# Patient Record
Sex: FEMALE | Race: WHITE | URBAN - METROPOLITAN AREA
[De-identification: names, ages, dates, MRNs, and addresses within clinical notes are randomized per-mention and may not be internally consistent; named-entity substitution may affect disease eponyms.]

---

## 2022-08-04 ENCOUNTER — OFFICE VISIT (OUTPATIENT)
Dept: OBGYN CLINIC | Age: 27
End: 2022-08-04
Payer: COMMERCIAL

## 2022-08-04 VITALS — SYSTOLIC BLOOD PRESSURE: 110 MMHG | WEIGHT: 135 LBS | BODY MASS INDEX: 22.47 KG/M2 | DIASTOLIC BLOOD PRESSURE: 68 MMHG

## 2022-08-04 DIAGNOSIS — G43.829 MENSTRUAL MIGRAINE WITHOUT STATUS MIGRAINOSUS, NOT INTRACTABLE: ICD-10-CM

## 2022-08-04 DIAGNOSIS — N92.1 BREAKTHROUGH BLEEDING ON OCPS: Primary | ICD-10-CM

## 2022-08-04 DIAGNOSIS — E28.2 PCOS (POLYCYSTIC OVARIAN SYNDROME): ICD-10-CM

## 2022-08-04 DIAGNOSIS — N92.1 MENOMETRORRHAGIA: ICD-10-CM

## 2022-08-04 DIAGNOSIS — N91.1 SECONDARY AMENORRHEA: ICD-10-CM

## 2022-08-04 DIAGNOSIS — Z30.09 ENCOUNTER FOR COUNSELING REGARDING CONTRACEPTION: ICD-10-CM

## 2022-08-04 PROCEDURE — 99214 OFFICE O/P EST MOD 30 MIN: CPT | Performed by: OBSTETRICS & GYNECOLOGY

## 2022-08-04 PROCEDURE — 76830 TRANSVAGINAL US NON-OB: CPT | Performed by: OBSTETRICS & GYNECOLOGY

## 2022-08-04 RX ORDER — PROPRANOLOL HYDROCHLORIDE 20 MG/1
20 TABLET ORAL DAILY
Qty: 90 TABLET | Refills: 0 | Status: SHIPPED | OUTPATIENT
Start: 2022-08-04

## 2022-08-04 RX ORDER — PROPRANOLOL HYDROCHLORIDE 20 MG/1
20 TABLET ORAL DAILY
COMMUNITY
End: 2022-08-04 | Stop reason: SDUPTHER

## 2022-08-04 RX ORDER — NORETHINDRONE ACETATE AND ETHINYL ESTRADIOL 1.5; 3 MG/1; UG/1
TABLET ORAL
COMMUNITY
Start: 2022-07-04

## 2022-08-04 RX ORDER — ESTRADIOL 2 MG/1
2 TABLET ORAL DAILY
Qty: 14 TABLET | Refills: 0 | Status: SHIPPED | OUTPATIENT
Start: 2022-08-04

## 2022-08-04 NOTE — PROGRESS NOTES
CC:  FU secondary amenorrhea while on continuous OCPs     HPI:  32 y.o.  G0  presents today for FU for c/o secondary amenorrhea while on continuous OCPs. See last note from 5/4/2022 for complete details when she presented to me as a new patient at that time. Patient's last menstrual period was 07/22/2022 (exact date). Moved from [place] in the past year. Pt c/o secondary amenorrhea w/ LMP approximately 10 months prior when I saw her initially. PCP: none     Contraception:  Junel (1.5/30mcg) OCP-- was taking in a continuous fashion  UPT NEG       Reports long hx of extremely HMB/IMB and was diagnosed previously w/ PCOS  BMI 22   Currently on Metormin 500mg BID   Reports last hgb A1 C was wnl but 2yrs ago was \"borderline diabetic\"     States has been on OCPs since age 11yo      States her last OBGYN instructed her to take her OCPs in a continuous fashion x3 months, then take a week off for a period to decrease the propensity for BTB. She states that she has not taken a birth control pill in 11 days and still has not had a period yet. Patient states this has been the first time she's ever not had a period at all and was concerned that something could be wrong. She reports that she has been taking the OCPs continuously like this for about a year. She also has a history of menstrual migraines and has headaches when she does not take her OCPs. Reports her last TVUS was around September 2021 in [place] and thinks it was normal other than some cysts on her ovaries. no post coital VB, no  dyspareunia.          Sexually active w/ same parner  No changes in sexual partners --declines STD testing                Since last visti:  Discussed with patient in depth at last visit that the likely etiology of her secondary amenorrhea is iatrogenic in nature as this is the purpose of taking the OCPs in continuous fashion (to intentionally skip periods altogether) given her history of very heavy/irregular periods in the past along with menstrual migraines. Reassurance given and labs performed to R/O any other potential etiology and patient counseled she is free to continue to take her OCPs as she has been in continuous fashion without placebo week. However, patient reports having recently started to have BTB on her OCPs in continuous fashion. Reports having spotting with \"brown clots\" off and on for 20 days. No additional VB for the past 10 days. No new symptoms otherwise. Labs:   TFTs, Prolactin, Hgb A1C all wnl         TVUS tdoay:  Uterus 39mL  EMS 1.85mm  Rt ov polycystic appearance  Lt ov limited views but appears polycystic   Min FF                    POTS:  Propranolol --requests refill today until she can find a PCP here  Has seen cardiology recently  No history HTN         GYN HISTORY:  As per HPI     Last Pap: 2022--negative cytology  Hx of Abnl Paps: none    Hx STDs: none  Gardasil vaccine: Thinks yes            OB History          0    Para   0    Term   0       0    AB   0    Living   0         SAB   0    IAB   0    Ectopic   0    Molar   0    Multiple   0    Live Births   0                  Past Medical History:   Diagnosis Date    Menometrorrhagia     Menstrual migraine     PCOS (polycystic ovarian syndrome)     POTS (postural orthostatic tachycardia syndrome)          Past Surgical History:   Procedure Laterality Date    HX ORTHOPAEDIC Bilateral     GRECIA HIP    HX TONSILLECTOMY           Outpatient Encounter Medications as of 2022   Medication Sig Dispense Refill    propranoloL (INDERAL) 20 mg tablet Take 20 mg by mouth daily. metFORMIN (GLUCOPHAGE) 500 mg tablet Take 500 mg by mouth two (2) times a day. norethindrone ac-eth estradioL 1.5-30 mg-mcg tab Take 1 Tablet by mouth daily. In a continuous fashion. 3 Dose Pack 4    [DISCONTINUED] norethindrone ac-eth estradioL 1.5-30 mg-mcg tab Take  by mouth daily.        No facility-administered encounter medications on file as of 5/4/2022. No Known Allergies      Family History   Problem Relation Age of Onset    Other Mother         \"precancer breast lumps-had bilateral masectomy\" rare gene in family; scleroderma    Breast Cancer Maternal Grandmother     Diabetes Maternal Grandmother     Osteoporosis Maternal Grandmother     Diabetes Maternal Grandfather     Diabetes Paternal Grandmother     Diabetes Paternal Grandfather     Hypertension Paternal Grandfather     Heart Disease Paternal Grandfather     Breast Cancer Maternal Great Grandmother     Breast Cancer Cousin          Social History     Socioeconomic History    Marital status: SINGLE   Tobacco Use    Smoking status: Never Smoker    Smokeless tobacco: Never Used   Substance and Sexual Activity    Alcohol use: Yes     Comment: occ    Sexual activity: Yes     Partners: Male     Birth control/protection: Pill           ROS:  Negative except as per HPI       PHYSICAL EXAM:  /68   Wt 135 lb (61.2 kg)   LMP 07/22/2022 (Exact Date)   BMI 22.47 kg/m²     Physical Exam:  Constitutional: She appears well-developed and well-nourished. No distress. HENT:    Head: Normocephalic and atraumatic. Cardiovascular: Regular pulse   Pulmonary/Chest: Effort normal  Skin: She is not diaphoretic. Psychiatric: She has a normal mood and affect. Her behavior is normal. Thought content normal. .      Pelvic exam not repeated today        Counseling:  Based on history, the way she has been taking her OCPs, and ultrasound images today still the most likely cause for her BTB is iatrogenic in nature. Very thin  EMS. Recommend trial of add back estrogen in addition to a break from her OCPs with backup condoms. If the AUB continues despite this after restarting her OCPs would recommend further evaluation with hysteroscopy D&C. Patient agrees.       ASSESSMENT/PLAN:   32 y.o., G0 w/ new BTB on continuous  OCPs for hx of HMB/IMB, menstrual migraines :    -See counseling  -Almost certainly iatrogenic in nature; needs add back estrogen  -DC OCPs for 2-4wks w/ backup condoms  -Rx Estrace 2mg PO QD x 14 days while off OCPs  -Can restart OCPs in continuous fashion thereafter  -RTO to recheck sxs in 3 months  -if IMB continues despite above interventions will plan for further evaluation with hysteroscopy D&C  -Refill of Propranolol sent --- patient to find PCP for further management thereafter             Marely Puentes MD

## 2023-03-13 ENCOUNTER — TELEPHONE (OUTPATIENT)
Dept: OBGYN CLINIC | Age: 28
End: 2023-03-13

## 2023-03-13 RX ORDER — NORETHINDRONE ACETATE AND ETHINYL ESTRADIOL .03; 1.5 MG/1; MG/1
1 TABLET ORAL DAILY
Qty: 1 PACKET | Refills: 1 | Status: SHIPPED | OUTPATIENT
Start: 2023-03-13

## 2023-03-13 RX ORDER — NORETHINDRONE ACETATE AND ETHINYL ESTRADIOL .03; 1.5 MG/1; MG/1
1 TABLET ORAL DAILY
COMMUNITY
End: 2023-03-13 | Stop reason: SDUPTHER

## 2023-05-28 SDOH — ECONOMIC STABILITY: FOOD INSECURITY: WITHIN THE PAST 12 MONTHS, THE FOOD YOU BOUGHT JUST DIDN'T LAST AND YOU DIDN'T HAVE MONEY TO GET MORE.: NEVER TRUE

## 2023-05-28 SDOH — ECONOMIC STABILITY: TRANSPORTATION INSECURITY
IN THE PAST 12 MONTHS, HAS LACK OF TRANSPORTATION KEPT YOU FROM MEETINGS, WORK, OR FROM GETTING THINGS NEEDED FOR DAILY LIVING?: NO

## 2023-05-28 SDOH — ECONOMIC STABILITY: FOOD INSECURITY: WITHIN THE PAST 12 MONTHS, YOU WORRIED THAT YOUR FOOD WOULD RUN OUT BEFORE YOU GOT MONEY TO BUY MORE.: NEVER TRUE

## 2023-05-28 SDOH — ECONOMIC STABILITY: HOUSING INSECURITY
IN THE LAST 12 MONTHS, WAS THERE A TIME WHEN YOU DID NOT HAVE A STEADY PLACE TO SLEEP OR SLEPT IN A SHELTER (INCLUDING NOW)?: NO

## 2023-05-28 SDOH — ECONOMIC STABILITY: INCOME INSECURITY: HOW HARD IS IT FOR YOU TO PAY FOR THE VERY BASICS LIKE FOOD, HOUSING, MEDICAL CARE, AND HEATING?: NOT HARD AT ALL

## 2023-05-31 ENCOUNTER — OFFICE VISIT (OUTPATIENT)
Dept: OBGYN CLINIC | Age: 28
End: 2023-05-31
Payer: COMMERCIAL

## 2023-05-31 VITALS
DIASTOLIC BLOOD PRESSURE: 72 MMHG | BODY MASS INDEX: 21.49 KG/M2 | HEIGHT: 65 IN | SYSTOLIC BLOOD PRESSURE: 96 MMHG | WEIGHT: 129 LBS

## 2023-05-31 DIAGNOSIS — Z30.09 ENCOUNTER FOR COUNSELING REGARDING CONTRACEPTION: ICD-10-CM

## 2023-05-31 DIAGNOSIS — Z12.4 PAP SMEAR FOR CERVICAL CANCER SCREENING: ICD-10-CM

## 2023-05-31 DIAGNOSIS — N94.6 SEVERE DYSMENORRHEA: ICD-10-CM

## 2023-05-31 DIAGNOSIS — Z80.3 FAMILY HISTORY OF BREAST CANCER: ICD-10-CM

## 2023-05-31 DIAGNOSIS — E28.2 POLYCYSTIC OVARIAN SYNDROME: ICD-10-CM

## 2023-05-31 DIAGNOSIS — Z01.419 WELL WOMAN EXAM WITH ROUTINE GYNECOLOGICAL EXAM: Primary | ICD-10-CM

## 2023-05-31 PROCEDURE — 99395 PREV VISIT EST AGE 18-39: CPT | Performed by: OBSTETRICS & GYNECOLOGY

## 2023-05-31 RX ORDER — NORETHINDRONE ACETATE AND ETHINYL ESTRADIOL .03; 1.5 MG/1; MG/1
1 TABLET ORAL DAILY
Qty: 1 PACKET | Refills: 1 | Status: CANCELLED | OUTPATIENT
Start: 2023-05-31

## 2023-05-31 RX ORDER — NORETHINDRONE ACETATE AND ETHINYL ESTRADIOL .03; 1.5 MG/1; MG/1
TABLET ORAL
Qty: 4 PACKET | Refills: 5 | Status: SHIPPED | OUTPATIENT
Start: 2023-05-31
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
## 2023-06-05 LAB
CYTOLOGIST CVX/VAG CYTO: NORMAL
CYTOLOGY CVX/VAG DOC THIN PREP: NORMAL
HPV REFLEX: NORMAL
Lab: NORMAL
PATH REPORT.FINAL DX SPEC: NORMAL
STAT OF ADQ CVX/VAG CYTO-IMP: NORMAL

## 2024-06-17 ENCOUNTER — OFFICE VISIT (OUTPATIENT)
Dept: OBGYN CLINIC | Age: 29
End: 2024-06-17

## 2024-06-17 VITALS
HEIGHT: 65 IN | SYSTOLIC BLOOD PRESSURE: 110 MMHG | DIASTOLIC BLOOD PRESSURE: 60 MMHG | BODY MASS INDEX: 22.66 KG/M2 | WEIGHT: 136 LBS

## 2024-06-17 DIAGNOSIS — Z87.898 HISTORY OF PREDIABETES: ICD-10-CM

## 2024-06-17 DIAGNOSIS — Z01.419 WELL WOMAN EXAM WITH ROUTINE GYNECOLOGICAL EXAM: Primary | ICD-10-CM

## 2024-06-17 DIAGNOSIS — R79.89 ELEVATED DHEA: ICD-10-CM

## 2024-06-17 DIAGNOSIS — Z80.3 FAMILY HISTORY OF BREAST CANCER: ICD-10-CM

## 2024-06-17 DIAGNOSIS — Z12.4 PAP SMEAR FOR CERVICAL CANCER SCREENING: ICD-10-CM

## 2024-06-17 DIAGNOSIS — E28.2 PCOS (POLYCYSTIC OVARIAN SYNDROME): ICD-10-CM

## 2024-06-17 DIAGNOSIS — R79.89 ELEVATED SERUM CREATININE: ICD-10-CM

## 2024-06-17 LAB
ALBUMIN SERPL-MCNC: 3.9 G/DL (ref 3.5–5)
ALBUMIN/GLOB SERPL: 1.4 (ref 1–1.9)
ALP SERPL-CCNC: 35 U/L (ref 35–104)
ALT SERPL-CCNC: 16 U/L (ref 12–65)
ANION GAP SERPL CALC-SCNC: 13 MMOL/L (ref 9–18)
AST SERPL-CCNC: 28 U/L (ref 15–37)
BILIRUB SERPL-MCNC: 0.6 MG/DL (ref 0–1.2)
BUN SERPL-MCNC: 9 MG/DL (ref 6–23)
CALCIUM SERPL-MCNC: 9.1 MG/DL (ref 8.8–10.2)
CHLORIDE SERPL-SCNC: 101 MMOL/L (ref 98–107)
CO2 SERPL-SCNC: 25 MMOL/L (ref 20–28)
CREAT SERPL-MCNC: 0.91 MG/DL (ref 0.6–1.1)
EST. AVERAGE GLUCOSE BLD GHB EST-MCNC: 99 MG/DL
GLOBULIN SER CALC-MCNC: 2.8 G/DL (ref 2.3–3.5)
GLUCOSE SERPL-MCNC: 86 MG/DL (ref 70–99)
HBA1C MFR BLD: 5.1 % (ref 0–5.6)
POTASSIUM SERPL-SCNC: 4.2 MMOL/L (ref 3.5–5.1)
PROT SERPL-MCNC: 6.7 G/DL (ref 6.3–8.2)
SODIUM SERPL-SCNC: 138 MMOL/L (ref 136–145)

## 2024-06-17 SDOH — ECONOMIC STABILITY: FOOD INSECURITY: WITHIN THE PAST 12 MONTHS, THE FOOD YOU BOUGHT JUST DIDN'T LAST AND YOU DIDN'T HAVE MONEY TO GET MORE.: NEVER TRUE

## 2024-06-17 SDOH — ECONOMIC STABILITY: FOOD INSECURITY: WITHIN THE PAST 12 MONTHS, YOU WORRIED THAT YOUR FOOD WOULD RUN OUT BEFORE YOU GOT MONEY TO BUY MORE.: NEVER TRUE

## 2024-06-17 SDOH — ECONOMIC STABILITY: INCOME INSECURITY: HOW HARD IS IT FOR YOU TO PAY FOR THE VERY BASICS LIKE FOOD, HOUSING, MEDICAL CARE, AND HEATING?: NOT HARD AT ALL

## 2024-06-17 NOTE — PROGRESS NOTES
CC:  Annual GYN exam    HPI:  29 y.o.  G0  presents today for a routine gynecological examination.  No LMP recorded. (Menstrual status: Other - See Notes).. Pt w/ long hx of extremely HMB/IMB, dysmenorrhea; prior diagnoses of PCOS at outside facility.       PCP: None currently    Contraception:    continuous OCPs    No periods w/ this regimen (only w/ running out of Rx previously-- see last note for complete details)       Has been on OCPs since age 14yo for aforementioned issues.   No excessive bleeidng w/ PSH  +hx anemia but no hx iron or blood tranfusions     Possible endometriosis has been discussed.  No pain w/ sex     reports 1 or 2 instances of postcoital spotting--rare; declines further w/u at this time      New sexual  partner; DECLINES STD testing            PCOS, hx PreDiabetes:  BMI wnl at 22   Was previously on Metformin 500mg BID -- has not taken in approx 1.5yrs      Labs 5/4/22:  Prolactin, TFTs wnl  hgb A1C 5.3     Today reports was told by a MD in [place] that her \"value on her adrenal gland was very high\" but she never had a recheck; unable to see this lab/records in careeverywhere   Would presume she is referring to [facility] given discussed in regards to prior w/u for PCOS there -- will check today       Also incidentally noted to have an elevated Creatintine at last check  11/2023 in care everywhere; will recheck labs today     No changes/inc in male pattern hair growth, acne, or deepening of voice, etc          Last TVUS 8/4/22:  Uterus 39mL  EMS 1.85mm  Rt ov polycystic appearance  Lt ov limited views but appears polycystic   Min FF              Hx Menstrual Migraines:  + headaches when she does not take her continuous OCPs.  No Aura           POTS:  Propranolol    Has seen cardiology    No history HTN           Fam hx Breast CA:  States family has an unknown linked cancer gene--- states not BRCA    Pt's mom w/ \"precancerous cells\" -- now s/p double mastectomy  MGM, mom's 1st cousin, maternal

## 2024-06-19 LAB — DHEA-S SERPL-MCNC: 222 UG/DL (ref 84.8–378)

## 2024-06-20 LAB
COLLECTION METHOD: NORMAL
CYTOLOGIST CVX/VAG CYTO: NORMAL
CYTOLOGY CVX/VAG DOC THIN PREP: NORMAL
HPV REFLEX: NORMAL
Lab: NORMAL
OTHER PT INFO: NORMAL
PAP SOURCE: NORMAL
PATH REPORT.FINAL DX SPEC: NORMAL
PREV CYTO INFO: NORMAL
PREV TREATMENT RESULTS: NORMAL
PREV TREATMENT: NORMAL
STAT OF ADQ CVX/VAG CYTO-IMP: NORMAL

## 2024-06-21 PROBLEM — Z87.898 HISTORY OF PREDIABETES: Status: ACTIVE | Noted: 2024-06-21

## 2024-06-21 RX ORDER — NORETHINDRONE ACETATE AND ETHINYL ESTRADIOL .03; 1.5 MG/1; MG/1
TABLET ORAL
Qty: 4 PACKET | Refills: 6 | Status: SHIPPED | OUTPATIENT
Start: 2024-06-21

## 2024-11-12 SDOH — HEALTH STABILITY: PHYSICAL HEALTH: ON AVERAGE, HOW MANY MINUTES DO YOU ENGAGE IN EXERCISE AT THIS LEVEL?: 50 MIN

## 2024-11-12 SDOH — HEALTH STABILITY: PHYSICAL HEALTH: ON AVERAGE, HOW MANY DAYS PER WEEK DO YOU ENGAGE IN MODERATE TO STRENUOUS EXERCISE (LIKE A BRISK WALK)?: 6 DAYS

## 2024-11-12 ASSESSMENT — PATIENT HEALTH QUESTIONNAIRE - PHQ9
SUM OF ALL RESPONSES TO PHQ QUESTIONS 1-9: 0
SUM OF ALL RESPONSES TO PHQ QUESTIONS 1-9: 0
SUM OF ALL RESPONSES TO PHQ9 QUESTIONS 1 & 2: 0
2. FEELING DOWN, DEPRESSED OR HOPELESS: NOT AT ALL
2. FEELING DOWN, DEPRESSED OR HOPELESS: NOT AT ALL
1. LITTLE INTEREST OR PLEASURE IN DOING THINGS: NOT AT ALL
SUM OF ALL RESPONSES TO PHQ QUESTIONS 1-9: 0
1. LITTLE INTEREST OR PLEASURE IN DOING THINGS: NOT AT ALL
SUM OF ALL RESPONSES TO PHQ9 QUESTIONS 1 & 2: 0
SUM OF ALL RESPONSES TO PHQ QUESTIONS 1-9: 0

## 2024-11-13 ENCOUNTER — OFFICE VISIT (OUTPATIENT)
Dept: INTERNAL MEDICINE CLINIC | Facility: CLINIC | Age: 29
End: 2024-11-13
Payer: COMMERCIAL

## 2024-11-13 VITALS
OXYGEN SATURATION: 100 % | WEIGHT: 140 LBS | HEIGHT: 65 IN | SYSTOLIC BLOOD PRESSURE: 106 MMHG | DIASTOLIC BLOOD PRESSURE: 79 MMHG | HEART RATE: 65 BPM | BODY MASS INDEX: 23.32 KG/M2

## 2024-11-13 DIAGNOSIS — G90.A POTS (POSTURAL ORTHOSTATIC TACHYCARDIA SYNDROME): Primary | ICD-10-CM

## 2024-11-13 DIAGNOSIS — Z76.89 ENCOUNTER TO ESTABLISH CARE: ICD-10-CM

## 2024-11-13 DIAGNOSIS — Z13.220 ENCOUNTER FOR SCREENING FOR LIPID DISORDER: ICD-10-CM

## 2024-11-13 DIAGNOSIS — Z79.899 OTHER LONG TERM (CURRENT) DRUG THERAPY: ICD-10-CM

## 2024-11-13 DIAGNOSIS — G90.A POTS (POSTURAL ORTHOSTATIC TACHYCARDIA SYNDROME): ICD-10-CM

## 2024-11-13 DIAGNOSIS — E28.2 PCOS (POLYCYSTIC OVARIAN SYNDROME): ICD-10-CM

## 2024-11-13 PROBLEM — Z87.898 HISTORY OF PREDIABETES: Status: RESOLVED | Noted: 2024-06-21 | Resolved: 2024-11-13

## 2024-11-13 PROBLEM — Z80.3 FAMILY HISTORY OF BREAST CANCER: Status: RESOLVED | Noted: 2023-05-31 | Resolved: 2024-11-13

## 2024-11-13 LAB
ALBUMIN SERPL-MCNC: 4.2 G/DL (ref 3.5–5)
ALBUMIN/GLOB SERPL: 1.4 (ref 1–1.9)
ALP SERPL-CCNC: 30 U/L (ref 35–104)
ALT SERPL-CCNC: 20 U/L (ref 8–45)
ANION GAP SERPL CALC-SCNC: 11 MMOL/L (ref 7–16)
AST SERPL-CCNC: 28 U/L (ref 15–37)
BASOPHILS # BLD: 0 K/UL (ref 0–0.2)
BASOPHILS NFR BLD: 1 % (ref 0–2)
BILIRUB SERPL-MCNC: 0.5 MG/DL (ref 0–1.2)
BUN SERPL-MCNC: 12 MG/DL (ref 6–23)
CALCIUM SERPL-MCNC: 9.2 MG/DL (ref 8.8–10.2)
CHLORIDE SERPL-SCNC: 103 MMOL/L (ref 98–107)
CHOLEST SERPL-MCNC: 167 MG/DL (ref 0–200)
CO2 SERPL-SCNC: 25 MMOL/L (ref 20–29)
CREAT SERPL-MCNC: 1.03 MG/DL (ref 0.6–1.1)
DIFFERENTIAL METHOD BLD: NORMAL
EOSINOPHIL # BLD: 0.1 K/UL (ref 0–0.8)
EOSINOPHIL NFR BLD: 1 % (ref 0.5–7.8)
ERYTHROCYTE [DISTWIDTH] IN BLOOD BY AUTOMATED COUNT: 11.9 % (ref 11.9–14.6)
GLOBULIN SER CALC-MCNC: 2.9 G/DL (ref 2.3–3.5)
GLUCOSE SERPL-MCNC: 95 MG/DL (ref 70–99)
HCT VFR BLD AUTO: 41.1 % (ref 35.8–46.3)
HDLC SERPL-MCNC: 75 MG/DL (ref 40–60)
HDLC SERPL: 2.2 (ref 0–5)
HGB BLD-MCNC: 13.8 G/DL (ref 11.7–15.4)
IMM GRANULOCYTES # BLD AUTO: 0 K/UL (ref 0–0.5)
IMM GRANULOCYTES NFR BLD AUTO: 0 % (ref 0–5)
LDLC SERPL CALC-MCNC: 73 MG/DL (ref 0–100)
LYMPHOCYTES # BLD: 1.6 K/UL (ref 0.5–4.6)
LYMPHOCYTES NFR BLD: 27 % (ref 13–44)
MCH RBC QN AUTO: 31.3 PG (ref 26.1–32.9)
MCHC RBC AUTO-ENTMCNC: 33.6 G/DL (ref 31.4–35)
MCV RBC AUTO: 93.2 FL (ref 82–102)
MONOCYTES # BLD: 0.3 K/UL (ref 0.1–1.3)
MONOCYTES NFR BLD: 5 % (ref 4–12)
NEUTS SEG # BLD: 3.9 K/UL (ref 1.7–8.2)
NEUTS SEG NFR BLD: 66 % (ref 43–78)
NRBC # BLD: 0 K/UL (ref 0–0.2)
PLATELET # BLD AUTO: 236 K/UL (ref 150–450)
PMV BLD AUTO: 10.1 FL (ref 9.4–12.3)
POTASSIUM SERPL-SCNC: 4 MMOL/L (ref 3.5–5.1)
PROT SERPL-MCNC: 7.2 G/DL (ref 6.3–8.2)
RBC # BLD AUTO: 4.41 M/UL (ref 4.05–5.2)
SODIUM SERPL-SCNC: 139 MMOL/L (ref 136–145)
TRIGL SERPL-MCNC: 96 MG/DL (ref 0–150)
VLDLC SERPL CALC-MCNC: 19 MG/DL (ref 6–23)
WBC # BLD AUTO: 5.8 K/UL (ref 4.3–11.1)

## 2024-11-13 PROCEDURE — 99204 OFFICE O/P NEW MOD 45 MIN: CPT | Performed by: INTERNAL MEDICINE

## 2024-11-13 RX ORDER — PROPRANOLOL HCL 20 MG
20 TABLET ORAL 2 TIMES DAILY
Qty: 180 TABLET | Refills: 3 | Status: SHIPPED | OUTPATIENT
Start: 2024-11-13 | End: 2025-11-08

## 2024-11-13 NOTE — PROGRESS NOTES
Maintenance:  -Pap smear due 6/17/2027    -Previous medical records and/or labs/tests available to me reviewed, any records outstanding not available requested  -The risks, benefits, and medical necessity of all medications, tests labs, and any other orders that were ordered at today's visit were discussed with the patient including all elective or patient requested orders.  Decision to order or not order tests are based on this risk/benefit ratio and medical necessity.  -The patient expresses understanding of the plan as I've explained it to her and is in agreement with the current plan.    Follow up: 1 year    Total Time: 45 minutes (chart review and in-exam time)    Signed: Fernando Duggan D.O.  11/13/24  10:46 AM

## 2025-04-30 NOTE — PROGRESS NOTES
[facility] - Initial Evaluation   Patient: Jasmin Earl   YOB: 1995      Location: [facility] HEMATOLOGY AND ONCOLOGY - Provider participated in today's evaluation via telemedicine in [place], [place]. Patient completed today's evaluation from [place].   Date of Evaluation: 5/20/2025      Referral Source: Jailene Fuller MD   Referral Reason: Z80.3 (ICD-10-CM) - Family history of breast cancer       Genetic Counselor: Nadja Cid MS, [facility], [facility] [place] counseling student        Jasmin Earl was referred for evaluation for the potential of hereditary cancer due to her family history of breast cancer. Jasmin has consented to a visit via telehealth. Personalized risk assessment was performed and genetic testing options were reviewed.  For full details, please see Risk Assessment and Discussion in this document.  Following genetic counseling, Jasmin's action plan is:    PURSUES TESTING: [employer] panel of 85 genes associated with hereditary cancer (including BRCA1 and BRCA2) was offered and accepted.  Test results should be available in approximately 3-4 weeks.     Relevant Personal Medical History   Jasmin is a 29-year-old female with no personal cancer history.    Hormonal history:  Age of Menarche: 12-13  Age of First Live Birth: Denies  Age of Menopause: Denies  Hormonal Replacement Therapy: N/A    Screening history:  Last mammogram: Denies  History of breast biopsy: N/A  Last pap smear: a year ago  Latest colonoscopy: Denies  History of polyps: N/A  Last dermatological exam: Hasn't seen in a while    Social history:  Tobacco use: Denies  Alcohol use: Socially    General medical history:  Past Medical History:   Diagnosis Date    Anemia 2008    Family history of breast cancer     History of prediabetes 06/21/2024    Menometrorrhagia     Menorrhagia     Menstrual migraine     PCOS (polycystic ovarian syndrome)     POTS (postural orthostatic

## 2025-05-20 ENCOUNTER — TELEMEDICINE (OUTPATIENT)
Dept: ONCOLOGY | Age: 30
End: 2025-05-20

## 2025-05-20 DIAGNOSIS — Z80.3 FAMILY HISTORY OF MALIGNANT NEOPLASM OF BREAST: Primary | ICD-10-CM

## 2025-05-28 ENCOUNTER — HOSPITAL ENCOUNTER (OUTPATIENT)
Dept: LAB | Age: 30
Discharge: HOME | End: 2025-05-28
Payer: COMMERCIAL

## 2025-05-28 DIAGNOSIS — Z80.3 FAMILY HISTORY OF MALIGNANT NEOPLASM OF BREAST: ICD-10-CM

## 2025-05-28 LAB
COLLECTION INFORMATION: NORMAL
DATE SENT TO REF LAB: NORMAL
Lab: NORMAL

## 2025-05-28 PROCEDURE — 36415 COLL VENOUS BLD VENIPUNCTURE: CPT

## 2025-06-11 ENCOUNTER — CLINICAL DOCUMENTATION (OUTPATIENT)
Dept: ONCOLOGY | Age: 30
End: 2025-06-11

## 2025-06-11 NOTE — PROGRESS NOTES
Date: 6/11/2025    Patient: Jasmin Earl  YOB: 1995    Provider: Nadja Cid MS DHAVAL Castellanos, you were previously referred by Jailene Fuller MD for an initial genetic consultation due to your family history of cancer. You were seen on May 20th, 2025 and consented to genetic testing, which was sent to [facility]. I called you on June 11th, 2025 to discuss the results of this testing. This letter is a summary of the results.     Results  During our initial meeting, we reviewed your personal and family history. Following discussion of your  history we discussed the benefits, limitations and possible impacts of genetic testing and you pursued a panel called [employer] looking at 85 genes in which pathogenic variants (harmful genetic differences) have been related to increased risk for cancer. The genes included in this panel were AIP, ALK, APC, GERARDO, BAP1, BARD1, BMPR1A, BRCA1, BRCA2, BRIP1, CDC73, CDH1, CDK4, CDKN1B, CDKN2A, CEBPA, CHEK2, DICER1, ETV6, FH, FLCN, GATA2, KIF1B, LZTR1, MAX, MEN1, MET, MLH1, MSH2, MSH6, MUTYH, NF1, NF2, NTHL1, PALB2, PHOX2B, PMS2, POT1, NKDWI1I, PTCH1, PTEN, RAD51C, RAD51D, RB1, RET, RUNX1, SDHA, SDHAF2, SDHB, SDHC, SDHD, SMAD4, SMARCA4, SMARCB1, SMARCE1, STK11, SUFU, WYAD760, TP53, TSC1, TSC2, VHL, WT1 , ATRIP, AXIN2, CTNNA1, DDX41, EGFR, EGLN1, HOXB13, KIT, MBD4, MITF, MLH3, MSH3, PALLD, PDGFRA, POLD1, POLE, RAD51B, RNF43, RPS20, TERT, EPCAM and GREM1.    This testing came back negative, meaning we did not identify a pathogenic variant that increases risk of developing cancer.     During our initial appointment we reviewed the potential reasons for a negative test result, and these include:  There is not a pathogenic variant in your family that increases risk of developing cancer, and all the cases of cancer in your family have been sporadic, or by chance.  There is a pathogenic variant in your family that increases the risk of developing

## 2025-08-17 SDOH — ECONOMIC STABILITY: FOOD INSECURITY: WITHIN THE PAST 12 MONTHS, THE FOOD YOU BOUGHT JUST DIDN'T LAST AND YOU DIDN'T HAVE MONEY TO GET MORE.: NEVER TRUE

## 2025-08-17 SDOH — ECONOMIC STABILITY: FOOD INSECURITY: WITHIN THE PAST 12 MONTHS, YOU WORRIED THAT YOUR FOOD WOULD RUN OUT BEFORE YOU GOT MONEY TO BUY MORE.: NEVER TRUE

## 2025-08-17 SDOH — ECONOMIC STABILITY: INCOME INSECURITY: IN THE LAST 12 MONTHS, WAS THERE A TIME WHEN YOU WERE NOT ABLE TO PAY THE MORTGAGE OR RENT ON TIME?: NO

## 2025-08-17 SDOH — ECONOMIC STABILITY: TRANSPORTATION INSECURITY
IN THE PAST 12 MONTHS, HAS THE LACK OF TRANSPORTATION KEPT YOU FROM MEDICAL APPOINTMENTS OR FROM GETTING MEDICATIONS?: NO

## 2025-08-20 ENCOUNTER — OFFICE VISIT (OUTPATIENT)
Dept: OBGYN CLINIC | Age: 30
End: 2025-08-20
Payer: COMMERCIAL

## 2025-08-20 VITALS
WEIGHT: 140 LBS | HEIGHT: 65 IN | DIASTOLIC BLOOD PRESSURE: 74 MMHG | BODY MASS INDEX: 23.32 KG/M2 | SYSTOLIC BLOOD PRESSURE: 112 MMHG

## 2025-08-20 DIAGNOSIS — E28.2 PCOS (POLYCYSTIC OVARIAN SYNDROME): ICD-10-CM

## 2025-08-20 DIAGNOSIS — G43.821 MENSTRUAL MIGRAINE WITH STATUS MIGRAINOSUS, NOT INTRACTABLE: ICD-10-CM

## 2025-08-20 DIAGNOSIS — N92.1 MENOMETRORRHAGIA: ICD-10-CM

## 2025-08-20 DIAGNOSIS — N94.6 SEVERE DYSMENORRHEA: ICD-10-CM

## 2025-08-20 DIAGNOSIS — Z01.419 WELL WOMAN EXAM WITH ROUTINE GYNECOLOGICAL EXAM: Primary | ICD-10-CM

## 2025-08-20 PROCEDURE — 99395 PREV VISIT EST AGE 18-39: CPT | Performed by: OBSTETRICS & GYNECOLOGY

## 2025-08-20 RX ORDER — NORETHINDRONE ACETATE AND ETHINYL ESTRADIOL .03; 1.5 MG/1; MG/1
TABLET ORAL
Qty: 4 PACKET | Refills: 6 | Status: SHIPPED | OUTPATIENT
Start: 2025-08-20